# Patient Record
Sex: FEMALE | Race: WHITE | Employment: STUDENT | URBAN - METROPOLITAN AREA
[De-identification: names, ages, dates, MRNs, and addresses within clinical notes are randomized per-mention and may not be internally consistent; named-entity substitution may affect disease eponyms.]

---
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
## 2024-01-03 RX ORDER — LEVOTHYROXINE SODIUM 112 UG/1
112 TABLET ORAL
Qty: 30 TABLET | Refills: 0 | Status: SHIPPED | OUTPATIENT
Start: 2024-01-03

## 2024-01-25 RX ORDER — LEVOTHYROXINE SODIUM 112 UG/1
112 TABLET ORAL
Qty: 90 TABLET | Refills: 0 | Status: SHIPPED | OUTPATIENT
Start: 2024-01-25

## 2024-05-06 RX ORDER — LEVOTHYROXINE SODIUM 112 UG/1
112 TABLET ORAL
Qty: 30 TABLET | Refills: 0 | Status: SHIPPED | OUTPATIENT
Start: 2024-05-06

## 2024-05-20 RX ORDER — LEVOTHYROXINE SODIUM 112 UG/1
TABLET ORAL
Qty: 30 TABLET | Refills: 0 | Status: SHIPPED | OUTPATIENT
Start: 2024-05-20

## 2024-06-07 RX ORDER — LEVOTHYROXINE SODIUM 112 UG/1
TABLET ORAL
Qty: 90 TABLET | Refills: 1 | Status: SHIPPED | OUTPATIENT
Start: 2024-06-07

## 2024-07-30 ENCOUNTER — TELEPHONE (OUTPATIENT)
Age: 19
End: 2024-07-30

## 2024-07-30 NOTE — TELEPHONE ENCOUNTER
Rosibel called to see if she can get labs done now. Headed to college soon.    Follow up scheduled for 10/7/2024      Please advise [phone]

## 2024-08-02 NOTE — TELEPHONE ENCOUNTER
Dr campbell would like to wait until the time of the appt to order labs... called and there was no ans.  LM on VM with the message to wait until the next appt

## 2024-09-09 RX ORDER — LEVOTHYROXINE SODIUM 112 UG/1
TABLET ORAL
Qty: 90 TABLET | Refills: 1 | Status: SHIPPED | OUTPATIENT
Start: 2024-09-09

## 2024-10-04 ENCOUNTER — OFFICE VISIT (OUTPATIENT)
Age: 19
End: 2024-10-04
Payer: COMMERCIAL

## 2024-10-04 VITALS
BODY MASS INDEX: 27.02 KG/M2 | SYSTOLIC BLOOD PRESSURE: 143 MMHG | DIASTOLIC BLOOD PRESSURE: 84 MMHG | OXYGEN SATURATION: 100 % | HEIGHT: 64 IN | RESPIRATION RATE: 19 BRPM | WEIGHT: 158.25 LBS | HEART RATE: 91 BPM

## 2024-10-04 DIAGNOSIS — E03.9 PRIMARY HYPOTHYROIDISM: ICD-10-CM

## 2024-10-04 DIAGNOSIS — E03.9 PRIMARY HYPOTHYROIDISM: Primary | ICD-10-CM

## 2024-10-04 PROCEDURE — 99214 OFFICE O/P EST MOD 30 MIN: CPT | Performed by: PEDIATRICS

## 2024-10-04 RX ORDER — SPIRONOLACTONE 50 MG/1
50 TABLET, FILM COATED ORAL 2 TIMES DAILY
COMMUNITY
Start: 2024-08-22

## 2024-10-04 NOTE — PROGRESS NOTES
Chief Complaint   Patient presents with    Follow-up    Thyroid Problem     Patient does not want to know weight

## 2024-10-04 NOTE — PROGRESS NOTES
379  Subjective:   Rosibel Herrera was seen for Follow-up (Thyroid & vitamin D)  Cc: 1. Primary hypothyroidism          2. Autoimmune thyroiditis         3. Goiter- resolved         4. Low vitamin D: normalised       HOPC:   1.Patient is 19 years and 4 months old with primary hypothyroidism and is on levothyroxine 112 mcg per day. Compliance with medication is good. She takes the meds in the morning. Patient has good energy, has normal bowel movements.  2.  Goiter, none. 3.  Autoimmune thyroiditis  4. Was on vitamin D 2000 international units every day, for low vitamin D and vitamin D levels has normalised and has stopped taking supplements now.  She has been eating healthy and eating more fruits and vegetables and avoiding junk foods. She works out at the gym regularly.      ROS: No bone pain, muscle cramps no abdominal pain, normal bowel movements Good energy, no weakness, menstrual cycles are regular.   Family history: diabetes: No, thyroid dysfunction:yes. Social history: in second year of college, college going well, [employer].    Past Medical History:   Diagnosis Date    Hashimoto's disease     Thyroid activity decreased      Past Surgical History:   Procedure Laterality Date    ORTHOPEDIC SURGERY Left     pins placed and removed after fracture     Social History     Socioeconomic History    Marital status: Single     Spouse name: Not on file    Number of children: Not on file    Years of education: Not on file    Highest education level: Not on file   Occupational History    Not on file   Tobacco Use    Smoking status: Never    Smokeless tobacco: Never   Substance and Sexual Activity    Alcohol use: Never    Drug use: No    Sexual activity: Not on file   Other Topics Concern    Not on file   Social History Narrative    Not on file     Social Determinants of Health     Financial Resource Strain: Not on file   Food Insecurity: Not on file   Transportation Needs: Not on file   Physical

## 2024-10-08 ENCOUNTER — TELEPHONE (OUTPATIENT)
Age: 19
End: 2024-10-08

## 2024-10-08 NOTE — TELEPHONE ENCOUNTER
Orders are good for a year.  Called and let pt know.  She expressed understanding and had no further questions

## 2024-10-08 NOTE — TELEPHONE ENCOUNTER
Rosibel called to speak with nurse regarding  lab order she didn't know she needed to get them drawn so soon.      Please advise [phone]

## 2024-10-10 LAB
T4 FREE SERPL-MCNC: 1.49 NG/DL (ref 0.82–1.77)
TSH SERPL DL<=0.005 MIU/L-ACNC: 7.51 UIU/ML (ref 0.45–4.5)

## 2024-10-15 ENCOUNTER — TELEPHONE (OUTPATIENT)
Age: 19
End: 2024-10-15

## 2024-10-15 DIAGNOSIS — E03.9 PRIMARY HYPOTHYROIDISM: ICD-10-CM

## 2024-10-15 DIAGNOSIS — E03.9 PRIMARY HYPOTHYROIDISM: Primary | ICD-10-CM

## 2024-10-15 RX ORDER — LEVOTHYROXINE SODIUM 112 UG/1
TABLET ORAL
Qty: 98 TABLET | Refills: 1 | Status: SHIPPED | OUTPATIENT
Start: 2024-10-15

## 2024-10-15 NOTE — TELEPHONE ENCOUNTER
Labs was reviewed.  TSH is elevated at 7.5.    Levothyroxine dose changed to 1 tablet once a day Monday through Saturday, 1.5 tablet every Sunday.    Repeat thyroid function tests in 2 months.    New prescription placed to the pharmacy.  Follow-up as scheduled.  Left message over the phone.

## 2024-10-15 NOTE — TELEPHONE ENCOUNTER
Patient is calling because she got her lab results and her thyroid numbers are high. Patient wants to know if her medication needs to be adjusted. Please advise.    Rosibel - #  [phone]

## 2025-06-01 DIAGNOSIS — E03.9 PRIMARY HYPOTHYROIDISM: ICD-10-CM

## 2025-06-02 RX ORDER — LEVOTHYROXINE SODIUM 112 UG/1
TABLET ORAL
Qty: 90 TABLET | Refills: 1 | Status: SHIPPED | OUTPATIENT
Start: 2025-06-02